# Patient Record
(demographics unavailable — no encounter records)

---

## 2024-10-08 NOTE — DATA REVIEWED
[de-identified] : CT sinus 9/27/24: FINDINGS:  Paranasal sinuses: Frontal: Clear. Frontoethmoidal recesses: Narrowed bilaterally. There are somewhat prominent agar nasi cells. Ethmoid: Minimal inflammatory changes on the right. Clear on the left. Maxillary: Minimal mucosal thickening in the region of bilateral maxillary ostia. Infundibula: Narrowed bilaterally. Sphenoid: Minimal mucosal thickening bilaterally. Sphenoethmoidal recesses: Focal near obstruction bilaterally.  Nasal cavity: Wavy nasal septal deviation with the septum contacting the paradoxically curved middle turbinate.  Bones: No destructive osseous lesions. The cribriform plate and lamina papyracea appear intact. The fovea ethmoidalis are symmetric.  Orbits: Unremarkable.  Tympanomastoid complexes: Clear  Brain: Unremarkable.  IMPRESSION:   Minimal right ethmoid, bilateral maxillary and sphenoid sinusitis with narrowing of both frontoethmoidal recesses and infundibula and focal near obstruction of the sphenoethmoidal recesses. J32.8  Wavy nasal septal deviation.

## 2024-10-08 NOTE — PHYSICAL EXAM
[Nasal Endoscopy Performed] : nasal endoscopy was performed, see procedure section for findings [] : septum deviated to the right [Midline] : trachea located in midline position [Normal] : no rashes [de-identified] : Bilateral nasal valve collapse with positive ella. [de-identified] : Bilateral inferior turbinate hypertrophy

## 2024-10-08 NOTE — HISTORY OF PRESENT ILLNESS
[de-identified] : Kiah Urbina is a 27 yo female who presents for evaluation of sinonasal issues. She notes chronic issues with nasal congestion, sinus pressure, and nasal drainage. She denies vision changes or pain/restriction of extraocular movements. She notes 2-3 sinus infections per year. She notes about one ear infection in the past year. She notes chronic issues with aural fullness and itchiness. She denies recent fevers or chills. She has not had allergy testing. She has been using flonase for the past month. [FreeTextEntry1] : 10/8/24 - Kiah presents for follow-up.  She completed maximal medical therapy. She notes sinus pressure. She notes some nasal congestion and postnasal drainge. She notes improvement in aural fullness. No fevers.

## 2024-10-08 NOTE — ASSESSMENT
[FreeTextEntry1] : Kiah Urbina presents for follow-up of chronic sinusitis and chronic rhinitis. She completed maximal medical therapy for sinusitis with antibiotics and topical nasal regimen. She has had resolution of otologic symptoms. Sinonasal endoscopy was performed today showing mild septal deviation to right and bilateral inferior turbinate hypertrophy. CT sinus was reviewed showing no evidence of sinus disease and mild septal deviation. She has bilateral nasal valve collapse with positive ella maneuver.  We discussed bilateral Vivaer procedure for nasal valve repair and inferior turbinate reduction. R/b/a discussed. Risks include but are not limited to bleeding, infection, crusting, scarring, injury to nasal mucosa, nasal swelling, persistence of nasal symptoms, need for future procedure. All questions were answered. She wishes to proceed.  - allergy referral - f/u for procedure